# Patient Record
Sex: FEMALE | Race: WHITE | NOT HISPANIC OR LATINO | URBAN - METROPOLITAN AREA
[De-identification: names, ages, dates, MRNs, and addresses within clinical notes are randomized per-mention and may not be internally consistent; named-entity substitution may affect disease eponyms.]

---

## 2024-03-11 ENCOUNTER — LAB VISIT (OUTPATIENT)
Dept: LAB | Facility: HOSPITAL | Age: 24
End: 2024-03-11

## 2024-03-11 ENCOUNTER — OFFICE VISIT (OUTPATIENT)
Dept: OBSTETRICS AND GYNECOLOGY | Facility: CLINIC | Age: 24
End: 2024-03-11

## 2024-03-11 VITALS
SYSTOLIC BLOOD PRESSURE: 110 MMHG | BODY MASS INDEX: 42.41 KG/M2 | WEIGHT: 248.44 LBS | DIASTOLIC BLOOD PRESSURE: 82 MMHG | HEIGHT: 64 IN

## 2024-03-11 DIAGNOSIS — Z01.419 ENCOUNTER FOR ANNUAL ROUTINE GYNECOLOGICAL EXAMINATION: Primary | ICD-10-CM

## 2024-03-11 DIAGNOSIS — N91.2 AMENORRHEA: ICD-10-CM

## 2024-03-11 DIAGNOSIS — Z12.4 CERVICAL CANCER SCREENING: ICD-10-CM

## 2024-03-11 DIAGNOSIS — Z11.3 SCREEN FOR STD (SEXUALLY TRANSMITTED DISEASE): ICD-10-CM

## 2024-03-11 DIAGNOSIS — Z87.42 HISTORY OF PCOS: ICD-10-CM

## 2024-03-11 LAB
B-HCG UR QL: NEGATIVE
CTP QC/QA: YES
DHEA-S SERPL-MCNC: 366 UG/DL (ref 134.2–407.4)
ERYTHROCYTE [DISTWIDTH] IN BLOOD BY AUTOMATED COUNT: 13.2 % (ref 11.5–14.5)
ESTIMATED AVG GLUCOSE: 97 MG/DL (ref 68–131)
ESTRADIOL SERPL-MCNC: 50 PG/ML
FSH SERPL-ACNC: 5.71 MIU/ML
HBA1C MFR BLD: 5 % (ref 4–5.6)
HCG INTACT+B SERPL-ACNC: <2.4 MIU/ML
HCT VFR BLD AUTO: 43.2 % (ref 37–48.5)
HGB BLD-MCNC: 13.8 G/DL (ref 12–16)
LH SERPL-ACNC: 9.3 MIU/ML
MCH RBC QN AUTO: 29.4 PG (ref 27–31)
MCHC RBC AUTO-ENTMCNC: 31.9 G/DL (ref 32–36)
MCV RBC AUTO: 92 FL (ref 82–98)
PLATELET # BLD AUTO: 303 K/UL (ref 150–450)
PMV BLD AUTO: 10.1 FL (ref 9.2–12.9)
PROGEST SERPL-MCNC: 0.3 NG/ML
PROLACTIN SERPL IA-MCNC: 8.4 NG/ML (ref 5.2–26.5)
RBC # BLD AUTO: 4.69 M/UL (ref 4–5.4)
TESTOST SERPL-MCNC: 86 NG/DL (ref 5–73)
WBC # BLD AUTO: 8.28 K/UL (ref 3.9–12.7)

## 2024-03-11 PROCEDURE — 99999PBSHW PR PBB SHADOW TECHNICAL ONLY FILED TO HB: Mod: PBBFAC,,,

## 2024-03-11 PROCEDURE — 85027 COMPLETE CBC AUTOMATED: CPT

## 2024-03-11 PROCEDURE — 88175 CYTOPATH C/V AUTO FLUID REDO: CPT

## 2024-03-11 PROCEDURE — 36415 COLL VENOUS BLD VENIPUNCTURE: CPT

## 2024-03-11 PROCEDURE — 87491 CHLMYD TRACH DNA AMP PROBE: CPT

## 2024-03-11 PROCEDURE — 83001 ASSAY OF GONADOTROPIN (FSH): CPT

## 2024-03-11 PROCEDURE — 84144 ASSAY OF PROGESTERONE: CPT

## 2024-03-11 PROCEDURE — 99203 OFFICE O/P NEW LOW 30 MIN: CPT | Mod: PBBFAC

## 2024-03-11 PROCEDURE — 83525 ASSAY OF INSULIN: CPT

## 2024-03-11 PROCEDURE — 83036 HEMOGLOBIN GLYCOSYLATED A1C: CPT

## 2024-03-11 PROCEDURE — 99385 PREV VISIT NEW AGE 18-39: CPT | Mod: S$PBB,,,

## 2024-03-11 PROCEDURE — 83498 ASY HYDROXYPROGESTERONE 17-D: CPT

## 2024-03-11 PROCEDURE — 99999PBSHW POCT URINE PREGNANCY: Mod: PBBFAC,,,

## 2024-03-11 PROCEDURE — 81025 URINE PREGNANCY TEST: CPT | Mod: PBBFAC

## 2024-03-11 PROCEDURE — 99999 PR PBB SHADOW E&M-NEW PATIENT-LVL III: CPT | Mod: PBBFAC,,,

## 2024-03-11 PROCEDURE — 83002 ASSAY OF GONADOTROPIN (LH): CPT

## 2024-03-11 PROCEDURE — 84146 ASSAY OF PROLACTIN: CPT

## 2024-03-11 PROCEDURE — 84403 ASSAY OF TOTAL TESTOSTERONE: CPT

## 2024-03-11 PROCEDURE — 84702 CHORIONIC GONADOTROPIN TEST: CPT

## 2024-03-11 PROCEDURE — 82670 ASSAY OF TOTAL ESTRADIOL: CPT

## 2024-03-11 PROCEDURE — 87591 N.GONORRHOEAE DNA AMP PROB: CPT

## 2024-03-11 PROCEDURE — 82627 DEHYDROEPIANDROSTERONE: CPT

## 2024-03-11 RX ORDER — MEDROXYPROGESTERONE ACETATE 10 MG/1
10 TABLET ORAL DAILY
Qty: 10 TABLET | Refills: 0 | Status: SHIPPED | OUTPATIENT
Start: 2024-03-11

## 2024-03-11 RX ORDER — SERTRALINE HYDROCHLORIDE 25 MG/1
TABLET, FILM COATED ORAL
COMMUNITY
Start: 2022-06-08

## 2024-03-11 NOTE — PROGRESS NOTES
Subjective:       Patient ID: Renée Mckeon is a 23 y.o. female.    Chief Complaint:  Annual Exam and Amenorrhea      History of Present Illness  HPI  Annual Exam-Premenopausal  Patient presents for annual exam. The patient has complaints today of irregular menstrual cycles. The patient is sexually active, MM relationship. GYN screening history: last pap: approximate date  and was normal. Previous GYN care at [facility]. The patient wears seatbelts: yes. The patient participates in regular exercise: yes. Has the patient ever been transfused or tattooed?: yes. The patient reports that there is not domestic violence in her life.    Patient has a known history of PCOS with amenorrhea and elevated testosterone.   She was previously on OCP TriSprintec for management and would have monthly withdrawal bleed   Discontinued OCP 10/2022 and has not had a cycle since, she also reports weight gain and increase in facial hair and right sided pelvic pain since stopping OCP   Pelvic US at [place] showed enlarged right ovary 2023    GYN & OB History  Patient's last menstrual period was 10/01/2022 (approximate).   Date of Last Pap: 3/11/2024    OB History    Para Term  AB Living   0 0 0 0 0 0   SAB IAB Ectopic Multiple Live Births   0 0 0 0 0       Review of Systems  Review of Systems   Constitutional: Negative.    HENT: Negative.     Eyes: Negative.    Respiratory: Negative.     Cardiovascular: Negative.    Gastrointestinal: Negative.    Genitourinary:  Positive for menstrual problem and pelvic pain.   Musculoskeletal: Negative.    Integumentary:  Positive for hair changes.   Neurological: Negative.    Hematological: Negative.    Psychiatric/Behavioral: Negative.     All other systems reviewed and are negative.  Breast: Positive for breast self exam.          Objective:      Physical Exam:   Constitutional: She is oriented to person, place, and time.    HENT:   Head: Normocephalic and atraumatic.   Nose:  Nose normal.    Eyes: Pupils are equal, round, and reactive to light. Conjunctivae and EOM are normal.     Cardiovascular:  Normal rate and regular rhythm.             Pulmonary/Chest: Effort normal. She has no decreased breath sounds. She has no rhonchi. Right breast exhibits no inverted nipple, no mass, no nipple discharge, no tenderness and no bleeding. Left breast exhibits no inverted nipple, no mass, no nipple discharge, no tenderness and no bleeding. Breasts are symmetrical.        Abdominal: Soft. There is no abdominal tenderness.     Genitourinary:    Inguinal canal, vagina, uterus, right adnexa, left adnexa and rectum normal.      Pelvic exam was performed with patient supine.   The external female genitalia was normal.   No external genitalia lesions identified,Genitalia hair distrobution normal .     Labial bartholins normal.Cervix is normal. Right adnexum displays no mass and no tenderness. Left adnexum displays no mass and no tenderness. No vaginal discharge, tenderness or bleeding in the vagina. Vagina was moist.Cervix exhibits no motion tenderness, no discharge and no tenderness.    pap smear completedUerus contour normal  Uterus is not tender. Normal urethral meatus.          Musculoskeletal: Normal range of motion and moves all extremeties.       Neurological: She is alert and oriented to person, place, and time.    Skin: Skin is warm and dry.    Psychiatric: She has a normal mood and affect. Her speech is normal and behavior is normal. Mood, judgment and thought content normal.             Assessment:        1. Encounter for annual routine gynecological examination    2. Cervical cancer screening    3. History of PCOS    4. Amenorrhea    5. Screen for STD (sexually transmitted disease)               Plan:   Continue annual well woman exam.  Pap collected. Reviewed updated recommendations for pap smears (every 3 years) in low risk patients. Recommend annual pelvic exams. Reviewed recommendations for  annual CBE.and STI screening.   Safe sex practices and vaginosis prevention discussed.  Encouraged diet, exercise, and weight loss    Pt was counseled on chronic anovulation/PCOS, including common signs/symptoms and the many factors that influence it.  Pt was also counseled on available treatment options, including the associated risks and benefits of each.  Pt voiced understanding and desires to proceed with Provera withdrawal challenge and labs for now.  Medication dosing, side-effects, risks, benefits, and alternatives were discussed.    Pt was counseled on the link between obesity and PCOS.  Recommend a healthy diet and exercise regimen with goal 20% weight loss.      Diagnosis and orders this visit:  Encounter for annual routine gynecological examination    Cervical cancer screening  -     Liquid-Based Pap Smear, Screening    History of PCOS  -     Luteinizing hormone; Future; Expected date: 03/11/2024  -     Follicle stimulating hormone; Future; Expected date: 03/11/2024  -     Estradiol; Future; Expected date: 03/11/2024  -     Prolactin; Future; Expected date: 03/11/2024  -     Testosterone; Future; Expected date: 03/11/2024  -     DHEA-sulfate; Future; Expected date: 03/11/2024  -     Hemoglobin A1c; Future; Expected date: 03/11/2024  -     Progesterone; Future; Expected date: 03/11/2024  -     hCG, quantitative; Future; Expected date: 03/11/2024  -     17-Hydroxyprogesterone; Future; Expected date: 03/11/2024  -     Insulin, random; Future; Expected date: 03/11/2024  -     CBC Without Differential; Future; Expected date: 03/11/2024  -     medroxyPROGESTERone (PROVERA) 10 MG tablet; Take 1 tablet (10 mg total) by mouth once daily.  Dispense: 10 tablet; Refill: 0  -     US Pelvis Comp with Transvag NON-OB (xpd; Future; Expected date: 03/11/2024    Amenorrhea  -     Luteinizing hormone; Future; Expected date: 03/11/2024  -     Follicle stimulating hormone; Future; Expected date: 03/11/2024  -     Estradiol;  Future; Expected date: 03/11/2024  -     Prolactin; Future; Expected date: 03/11/2024  -     Testosterone; Future; Expected date: 03/11/2024  -     DHEA-sulfate; Future; Expected date: 03/11/2024  -     Hemoglobin A1c; Future; Expected date: 03/11/2024  -     Progesterone; Future; Expected date: 03/11/2024  -     hCG, quantitative; Future; Expected date: 03/11/2024  -     17-Hydroxyprogesterone; Future; Expected date: 03/11/2024  -     Insulin, random; Future; Expected date: 03/11/2024  -     CBC Without Differential; Future; Expected date: 03/11/2024  -     medroxyPROGESTERone (PROVERA) 10 MG tablet; Take 1 tablet (10 mg total) by mouth once daily.  Dispense: 10 tablet; Refill: 0  -     US Pelvis Comp with Transvag NON-OB (xpd; Future; Expected date: 03/11/2024  -     POCT Urine Pregnancy    Screen for STD (sexually transmitted disease)  -     C. trachomatis/N. gonorrhoeae by AMP ELLA Jarvis NP

## 2024-03-12 ENCOUNTER — PATIENT MESSAGE (OUTPATIENT)
Dept: OBSTETRICS AND GYNECOLOGY | Facility: CLINIC | Age: 24
End: 2024-03-12

## 2024-03-12 LAB
C TRACH DNA SPEC QL NAA+PROBE: NOT DETECTED
INSULIN COLLECTION INTERVAL: NORMAL
INSULIN SERPL-ACNC: 15.6 UU/ML
N GONORRHOEA DNA SPEC QL NAA+PROBE: NOT DETECTED

## 2024-03-13 ENCOUNTER — HOSPITAL ENCOUNTER (OUTPATIENT)
Dept: RADIOLOGY | Facility: HOSPITAL | Age: 24
Discharge: HOME | End: 2024-03-13

## 2024-03-13 DIAGNOSIS — Z87.42 HISTORY OF PCOS: ICD-10-CM

## 2024-03-13 DIAGNOSIS — N91.2 AMENORRHEA: ICD-10-CM

## 2024-03-13 PROCEDURE — 76856 US EXAM PELVIC COMPLETE: CPT | Mod: 26,,, | Performed by: RADIOLOGY

## 2024-03-13 PROCEDURE — 76830 TRANSVAGINAL US NON-OB: CPT | Mod: 26,,, | Performed by: RADIOLOGY

## 2024-03-13 PROCEDURE — 76830 TRANSVAGINAL US NON-OB: CPT | Mod: TC

## 2024-03-13 NOTE — PROGRESS NOTES
Contacted patient regarding recent Pelvic US and Lab results. Verified patient identifiers and informed patient of results and to schedule virtual F/U with NP to discuss lab results. Patient voiced understanding and scheduled.

## 2024-03-15 LAB — 17OHP SERPL-MCNC: 139 NG/DL (ref 35–413)

## 2024-03-18 ENCOUNTER — OFFICE VISIT (OUTPATIENT)
Dept: OBSTETRICS AND GYNECOLOGY | Facility: CLINIC | Age: 24
End: 2024-03-18

## 2024-03-18 DIAGNOSIS — N91.5 OLIGOMENORRHEA, UNSPECIFIED TYPE: ICD-10-CM

## 2024-03-18 DIAGNOSIS — E28.2 PCOS (POLYCYSTIC OVARIAN SYNDROME): Primary | ICD-10-CM

## 2024-03-18 DIAGNOSIS — R79.89 ELEVATED TESTOSTERONE LEVEL IN FEMALE: ICD-10-CM

## 2024-03-18 PROCEDURE — 99212 OFFICE O/P EST SF 10 MIN: CPT | Mod: 95,,,

## 2024-03-18 NOTE — PROGRESS NOTES
Subjective:       Patient ID: Renée Mckeon is a 23 y.o. female.    Chief Complaint:  No chief complaint on file.      History of Present Illness  HPI  The patient location is: home   The chief complaint leading to consultation is: labs and pelvic US follow-up     Visit type: audiovisual    Face to Face time with patient: 15 minutes of total time spent on the encounter, which includes face to face time and non-face to face time preparing to see the patient (eg, review of tests), Obtaining and/or reviewing separately obtained history, Documenting clinical information in the electronic or other health record, Independently interpreting results (not separately reported) and communicating results to the patient/family/caregiver, or Care coordination (not separately reported).     Each patient to whom he or she provides medical services by telemedicine is:  (1) informed of the relationship between the physician and patient and the respective role of any other health care provider with respect to management of the patient; and (2) notified that he or she may decline to receive medical services by telemedicine and may withdraw from such care at any time.    Notes:   Patient with known history of PCOS with oligomenorrhea and elevated testosterone presents for follow up to discuss labs and pelvic US   Elevated testosterone at 86, all other labs WNL   Pelvic US- WNL     Patient previously prescribed COCs to manage PCOS in the past and reports +withdrawal bleed   She is aware that COCs is the first line treatment but does not desire to take them at this time, she is interested in proceeding with lifestyle changes (weight loss), supplements (Mukul-inositol), and possible starting Metformin.         She is currently on day 8/10 of cyclic Provera, which was initiated at her last visit.   GYN & OB History  Patient's last menstrual period was 10/01/2022 (approximate).   Date of Last Pap: 3/12/2024    OB History    Para Term   AB Living   0 0 0 0 0 0   SAB IAB Ectopic Multiple Live Births   0 0 0 0 0       Review of Systems  Review of Systems   Constitutional: Negative.    HENT: Negative.     Eyes: Negative.    Respiratory: Negative.     Cardiovascular: Negative.    Gastrointestinal: Negative.    Genitourinary:  Positive for menstrual problem.   Musculoskeletal: Negative.    Integumentary:  Negative.   Neurological: Negative.    Hematological: Negative.    Psychiatric/Behavioral: Negative.     All other systems reviewed and are negative.  Breast: negative.            Objective:      Physical Exam:   Constitutional: She is oriented to person, place, and time. She appears well-developed and well-nourished.    HENT:   Head: Normocephalic and atraumatic.    Eyes: Pupils are equal, round, and reactive to light. Conjunctivae and EOM are normal.      Pulmonary/Chest: Effort normal.                  Musculoskeletal: Normal range of motion.       Neurological: She is alert and oriented to person, place, and time.     Psychiatric: She has a normal mood and affect. Her behavior is normal. Judgment and thought content normal.           Assessment:        1. PCOS (polycystic ovarian syndrome)    2. Oligomenorrhea, unspecified type    3. Elevated testosterone level in female               Plan:   Continue annual well woman exam.    Diagnosis and orders this visit:  PCOS (polycystic ovarian syndrome)   - Pt was counseled on chronic anovulation/PCOS, including common signs/symptoms and the many factors that influence it.  Pt was also counseled on available treatment options, including the associated risks and benefits of each.  Pt voiced understanding and desires to proceed with cyclic Provera and supplements (Mukul-inositol).  Medication dosing, side-effects, risks, benefits, and alternatives were discussed.    Pt was counseled on the link between obesity and PCOS.  Recommend a healthy diet and exercise regimen with goal 20% weight  loss.    Oligomenorrhea, unspecified type   - continue Provera, notify me if no withdrawal bleed   - consider cyclic Provera for at least 6 months     Elevated testosterone level in female      Follow up with me in 3 months        Cassi Jarvis, NP

## 2024-03-19 LAB
FINAL PATHOLOGIC DIAGNOSIS: NORMAL
Lab: NORMAL

## 2024-06-26 ENCOUNTER — PATIENT MESSAGE (OUTPATIENT)
Dept: OBSTETRICS AND GYNECOLOGY | Facility: CLINIC | Age: 24
End: 2024-06-26

## 2024-06-26 DIAGNOSIS — N91.5 OLIGOMENORRHEA, UNSPECIFIED TYPE: Primary | ICD-10-CM

## 2024-07-08 RX ORDER — MEDROXYPROGESTERONE ACETATE 10 MG/1
10 TABLET ORAL DAILY
Qty: 10 TABLET | Refills: 6 | Status: SHIPPED | OUTPATIENT
Start: 2024-07-08 | End: 2024-07-18

## 2024-09-06 ENCOUNTER — PATIENT MESSAGE (OUTPATIENT)
Dept: OBSTETRICS AND GYNECOLOGY | Facility: CLINIC | Age: 24
End: 2024-09-06

## 2024-09-06 DIAGNOSIS — F41.9 ANXIETY AND DEPRESSION: Primary | ICD-10-CM

## 2024-09-06 DIAGNOSIS — F32.A ANXIETY AND DEPRESSION: Primary | ICD-10-CM

## 2024-09-06 RX ORDER — SERTRALINE HYDROCHLORIDE 25 MG/1
25 TABLET, FILM COATED ORAL DAILY
Qty: 30 TABLET | Refills: 3 | Status: SHIPPED | OUTPATIENT
Start: 2024-09-06